# Patient Record
Sex: MALE | Race: BLACK OR AFRICAN AMERICAN | NOT HISPANIC OR LATINO | URBAN - METROPOLITAN AREA
[De-identification: names, ages, dates, MRNs, and addresses within clinical notes are randomized per-mention and may not be internally consistent; named-entity substitution may affect disease eponyms.]

---

## 2021-11-16 ENCOUNTER — OUT OF OFFICE VISIT (OUTPATIENT)
Dept: URBAN - METROPOLITAN AREA MEDICAL CENTER 16 | Facility: MEDICAL CENTER | Age: 51
End: 2021-11-16
Payer: MEDICARE

## 2021-11-16 DIAGNOSIS — K85.10 ACUTE BILIARY PANCREATITIS: ICD-10-CM

## 2021-11-16 DIAGNOSIS — K80.20 ASYMPTOMATIC CHOLELITHIASIS: ICD-10-CM

## 2021-11-16 PROCEDURE — G8427 DOCREV CUR MEDS BY ELIG CLIN: HCPCS | Performed by: INTERNAL MEDICINE

## 2021-11-16 PROCEDURE — 99232 SBSQ HOSP IP/OBS MODERATE 35: CPT | Performed by: INTERNAL MEDICINE

## 2021-11-16 PROCEDURE — 99222 1ST HOSP IP/OBS MODERATE 55: CPT | Performed by: INTERNAL MEDICINE

## 2022-10-28 ENCOUNTER — OFFICE VISIT (OUTPATIENT)
Dept: URBAN - METROPOLITAN AREA CLINIC 118 | Facility: CLINIC | Age: 52
End: 2022-10-28
Payer: MEDICARE

## 2022-10-28 ENCOUNTER — DASHBOARD ENCOUNTERS (OUTPATIENT)
Age: 52
End: 2022-10-28

## 2022-10-28 ENCOUNTER — LAB OUTSIDE AN ENCOUNTER (OUTPATIENT)
Dept: URBAN - METROPOLITAN AREA CLINIC 118 | Facility: CLINIC | Age: 52
End: 2022-10-28

## 2022-10-28 VITALS
HEART RATE: 71 BPM | BODY MASS INDEX: 35.63 KG/M2 | SYSTOLIC BLOOD PRESSURE: 160 MMHG | DIASTOLIC BLOOD PRESSURE: 71 MMHG | TEMPERATURE: 97.7 F | WEIGHT: 227 LBS | HEIGHT: 67 IN

## 2022-10-28 DIAGNOSIS — K62.5 RECTAL BLEEDING: ICD-10-CM

## 2022-10-28 DIAGNOSIS — K92.1 BLACK STOOL: ICD-10-CM

## 2022-10-28 PROCEDURE — 99204 OFFICE O/P NEW MOD 45 MIN: CPT | Performed by: INTERNAL MEDICINE

## 2022-10-28 RX ORDER — PANTOPRAZOLE SODIUM 40 MG/1
1 TABLET TABLET, DELAYED RELEASE ORAL ONCE A DAY
Qty: 30 | Refills: 2 | OUTPATIENT
Start: 2022-10-28

## 2022-10-28 NOTE — HPI-TODAY'S VISIT:
Mr. Biswas is a 53 y/o AAM who presents today due to GI bleeding.  He reports starting last week he noticed some bright red blood in the stool. After that, he reports some dark stools with bright red blood around them. On Tuesday/Wednesday of this week, he reports brown stools with a small amount of blood. Last night he reports brown stool. He states this has not had this happen before.  He denies taking pepto bismol and iron. He was taking ASA/apple cider vinegar over the past week but has since stopped.  He denies abdominal pain, pain with having a bowel movement, reflux/heartburn, unintentional weight loss, dizziness, shortness of breath, fatigue or weakness. His last colonoscopy was normal in 2014 per patient. He also had a normal EGD at that time. Denies family history of colon cancer.

## 2022-10-29 LAB
ABSOLUTE BASOPHILS: 49
ABSOLUTE EOSINOPHILS: 178
ABSOLUTE LYMPHOCYTES: 3110
ABSOLUTE MONOCYTES: 510
ABSOLUTE NEUTROPHILS: 4253
BASOPHILS: 0.6
EOSINOPHILS: 2.2
HEMATOCRIT: 30.6
HEMOGLOBIN: 10.5
LYMPHOCYTES: 38.4
MCH: 28.3
MCHC: 34.3
MCV: 82.5
MONOCYTES: 6.3
MPV: 11.3
NEUTROPHILS: 52.5
PLATELET COUNT: 292
RDW: 14.3
RED BLOOD CELL COUNT: 3.71
WHITE BLOOD CELL COUNT: 8.1

## 2022-11-02 ENCOUNTER — OFFICE VISIT (OUTPATIENT)
Dept: URBAN - METROPOLITAN AREA SURGERY CENTER 23 | Facility: SURGERY CENTER | Age: 52
End: 2022-11-02

## 2022-11-03 ENCOUNTER — TELEPHONE ENCOUNTER (OUTPATIENT)
Dept: URBAN - METROPOLITAN AREA CLINIC 118 | Facility: CLINIC | Age: 52
End: 2022-11-03